# Patient Record
Sex: FEMALE | URBAN - METROPOLITAN AREA
[De-identification: names, ages, dates, MRNs, and addresses within clinical notes are randomized per-mention and may not be internally consistent; named-entity substitution may affect disease eponyms.]

---

## 2018-08-24 ENCOUNTER — OFFICE VISIT (OUTPATIENT)
Dept: URBAN - METROPOLITAN AREA CLINIC 88 | Facility: CLINIC | Age: 48
End: 2018-08-24
Payer: COMMERCIAL

## 2018-08-24 DIAGNOSIS — H25.13 AGE-RELATED NUCLEAR CATARACT, BILATERAL: ICD-10-CM

## 2018-08-24 DIAGNOSIS — H11.153 PINGUECULA, BILATERAL: ICD-10-CM

## 2018-08-24 DIAGNOSIS — H04.123 DRY EYE SYNDROME OF BILATERAL LACRIMAL GLANDS: Primary | ICD-10-CM

## 2018-08-24 PROCEDURE — 99204 OFFICE O/P NEW MOD 45 MIN: CPT | Performed by: OPTOMETRIST

## 2018-08-24 ASSESSMENT — INTRAOCULAR PRESSURE
OS: 13
OD: 11

## 2018-08-24 ASSESSMENT — VISUAL ACUITY
OS: 20/20
OD: 20/20

## 2018-08-24 NOTE — IMPRESSION/PLAN
Impression: Pinguecula, bilateral: H11.153. Plan: Patient educated to condition. Artificial tears and sunglasses with UV protection were recommended. Patient knows to RTC if redness or irritation are experienced.

## 2018-08-24 NOTE — IMPRESSION/PLAN
Impression: Dry eye syndrome of bilateral lacrimal glands: H04.123. Plan: Discussed condition in detail with patient. Recommended PF artificial tears QID OU, Genteal Gel BID OU, and Omega-3 supplement 1500mg PO QD. If symptoms persist, consider restasis and/or punctal occlusion.

## 2018-08-24 NOTE — IMPRESSION/PLAN
Impression: Age-related nuclear cataract, bilateral: H25.13. Plan: Reviewed status of cataract with patient and potential effect on visual potential.  Patient is not interested in surgery at this time and is comfortable monitoring for any decrease in vision before considering surgery options. All patient questions were answered and patient appears satisfied in their understanding of condition. Patient knows to return to clinic if changes in vision experienced.